# Patient Record
Sex: FEMALE | Race: WHITE | NOT HISPANIC OR LATINO | Employment: UNEMPLOYED | URBAN - METROPOLITAN AREA
[De-identification: names, ages, dates, MRNs, and addresses within clinical notes are randomized per-mention and may not be internally consistent; named-entity substitution may affect disease eponyms.]

---

## 2019-02-22 ENCOUNTER — TELEPHONE (OUTPATIENT)
Dept: URGENT CARE | Facility: CLINIC | Age: 34
End: 2019-02-22

## 2019-02-22 DIAGNOSIS — R05.9 COUGH: Primary | ICD-10-CM

## 2019-02-22 RX ORDER — ALBUTEROL SULFATE 1.25 MG/3ML
1 SOLUTION RESPIRATORY (INHALATION) EVERY 6 HOURS PRN
Qty: 30 VIAL | Refills: 0 | Status: SHIPPED | OUTPATIENT
Start: 2019-02-22 | End: 2020-05-19

## 2019-02-22 RX ORDER — DEXTROMETHORPHAN HYDROBROMIDE AND PROMETHAZINE HYDROCHLORIDE 15; 6.25 MG/5ML; MG/5ML
5 SYRUP ORAL 4 TIMES DAILY PRN
Qty: 240 ML | Refills: 0 | OUTPATIENT
Start: 2019-02-22 | End: 2019-02-26

## 2019-02-22 NOTE — TELEPHONE ENCOUNTER
Pt was told Tuesday if she needed a cough med she could call back and we would send it in to her pharmacy. MARIA DE JESUS BERRY consulted and will send in cough meds and pt also requested some albuterol for her nebulizer.

## 2019-02-26 PROBLEM — R05.9 COUGH: Status: ACTIVE | Noted: 2019-02-26

## 2019-02-26 PROBLEM — H66.92 OTITIS OF LEFT EAR: Status: ACTIVE | Noted: 2019-02-26

## 2019-06-10 ENCOUNTER — OFFICE VISIT (OUTPATIENT)
Dept: FAMILY MEDICINE CLINIC | Facility: CLINIC | Age: 34
End: 2019-06-10

## 2019-06-10 VITALS
BODY MASS INDEX: 28.72 KG/M2 | WEIGHT: 183 LBS | HEART RATE: 116 BPM | TEMPERATURE: 97.7 F | SYSTOLIC BLOOD PRESSURE: 110 MMHG | DIASTOLIC BLOOD PRESSURE: 68 MMHG | RESPIRATION RATE: 18 BRPM | HEIGHT: 67 IN | OXYGEN SATURATION: 98 %

## 2019-06-10 DIAGNOSIS — R63.5 WEIGHT GAIN: ICD-10-CM

## 2019-06-10 DIAGNOSIS — A60.00 HERPES SIMPLEX INFECTION OF GENITOURINARY SYSTEM: ICD-10-CM

## 2019-06-10 DIAGNOSIS — J30.1 NON-SEASONAL ALLERGIC RHINITIS DUE TO POLLEN: Primary | ICD-10-CM

## 2019-06-10 LAB
ALBUMIN SERPL-MCNC: 4.1 G/DL (ref 3.5–5.2)
ALBUMIN/GLOB SERPL: 1.5 G/DL
ALP SERPL-CCNC: 136 U/L (ref 39–117)
ALT SERPL W P-5'-P-CCNC: 29 U/L (ref 1–33)
ANION GAP SERPL CALCULATED.3IONS-SCNC: 13.5 MMOL/L
AST SERPL-CCNC: 26 U/L (ref 1–32)
BASOPHILS # BLD MANUAL: 0.19 10*3/MM3 (ref 0–0.2)
BASOPHILS NFR BLD AUTO: 3 % (ref 0–1.5)
BILIRUB SERPL-MCNC: 0.2 MG/DL (ref 0.2–1.2)
BUN BLD-MCNC: 9 MG/DL (ref 6–20)
BUN/CREAT SERPL: 11 (ref 7–25)
CALCIUM SPEC-SCNC: 8.5 MG/DL (ref 8.6–10.5)
CHLORIDE SERPL-SCNC: 104 MMOL/L (ref 98–107)
CHOLEST SERPL-MCNC: 152 MG/DL (ref 0–200)
CO2 SERPL-SCNC: 20.5 MMOL/L (ref 22–29)
CREAT BLD-MCNC: 0.82 MG/DL (ref 0.57–1)
DEPRECATED RDW RBC AUTO: 43.7 FL (ref 37–54)
EOSINOPHIL # BLD MANUAL: 0.25 10*3/MM3 (ref 0–0.4)
EOSINOPHIL NFR BLD MANUAL: 4 % (ref 0.3–6.2)
ERYTHROCYTE [DISTWIDTH] IN BLOOD BY AUTOMATED COUNT: 13.6 % (ref 12.3–15.4)
GFR SERPL CREATININE-BSD FRML MDRD: 79 ML/MIN/1.73
GLOBULIN UR ELPH-MCNC: 2.8 GM/DL
GLUCOSE BLD-MCNC: 90 MG/DL (ref 65–99)
HCT VFR BLD AUTO: 44.2 % (ref 34–46.6)
HDLC SERPL-MCNC: 38 MG/DL (ref 40–60)
HGB BLD-MCNC: 14.3 G/DL (ref 12–15.9)
LDLC SERPL CALC-MCNC: 87 MG/DL (ref 0–100)
LDLC/HDLC SERPL: 2.3 {RATIO}
LYMPHOCYTES # BLD MANUAL: 3.56 10*3/MM3 (ref 0.7–3.1)
LYMPHOCYTES NFR BLD MANUAL: 5.1 % (ref 5–12)
LYMPHOCYTES NFR BLD MANUAL: 57.6 % (ref 19.6–45.3)
MCH RBC QN AUTO: 28.1 PG (ref 26.6–33)
MCHC RBC AUTO-ENTMCNC: 32.4 G/DL (ref 31.5–35.7)
MCV RBC AUTO: 87 FL (ref 79–97)
MONOCYTES # BLD AUTO: 0.32 10*3/MM3 (ref 0.1–0.9)
NEUTROPHILS # BLD AUTO: 1.69 10*3/MM3 (ref 1.7–7)
NEUTROPHILS NFR BLD MANUAL: 27.3 % (ref 42.7–76)
PLAT MORPH BLD: NORMAL
PLATELET # BLD AUTO: 196 10*3/MM3 (ref 140–450)
PMV BLD AUTO: 9.9 FL (ref 6–12)
POTASSIUM BLD-SCNC: 3.5 MMOL/L (ref 3.5–5.2)
PROT SERPL-MCNC: 6.9 G/DL (ref 6–8.5)
RBC # BLD AUTO: 5.08 10*6/MM3 (ref 3.77–5.28)
RBC MORPH BLD: NORMAL
SODIUM BLD-SCNC: 138 MMOL/L (ref 136–145)
TRIGL SERPL-MCNC: 133 MG/DL (ref 0–150)
TSH SERPL DL<=0.05 MIU/L-ACNC: 2.03 MIU/ML (ref 0.27–4.2)
VARIANT LYMPHS NFR BLD MANUAL: 3 % (ref 0–5)
VLDLC SERPL-MCNC: 26.6 MG/DL (ref 5–40)
WBC MORPH BLD: NORMAL
WBC NRBC COR # BLD: 6.18 10*3/MM3 (ref 3.4–10.8)

## 2019-06-10 PROCEDURE — 99215 OFFICE O/P EST HI 40 MIN: CPT | Performed by: FAMILY MEDICINE

## 2019-06-10 PROCEDURE — 85007 BL SMEAR W/DIFF WBC COUNT: CPT | Performed by: FAMILY MEDICINE

## 2019-06-10 PROCEDURE — 80050 GENERAL HEALTH PANEL: CPT | Performed by: FAMILY MEDICINE

## 2019-06-10 PROCEDURE — 80061 LIPID PANEL: CPT | Performed by: FAMILY MEDICINE

## 2019-06-10 RX ORDER — AMITRIPTYLINE HYDROCHLORIDE 50 MG/1
TABLET, FILM COATED ORAL
COMMUNITY
Start: 2019-06-04 | End: 2020-01-20

## 2019-06-10 RX ORDER — TOPIRAMATE 100 MG/1
100 TABLET, FILM COATED ORAL 2 TIMES DAILY
COMMUNITY
End: 2019-06-18

## 2019-06-10 RX ORDER — ACYCLOVIR 800 MG/1
800 TABLET ORAL DAILY
Qty: 30 TABLET | Refills: 5 | Status: SHIPPED | OUTPATIENT
Start: 2019-06-10 | End: 2020-01-20

## 2019-06-10 RX ORDER — HYDROXYZINE HYDROCHLORIDE 25 MG/1
25 TABLET, FILM COATED ORAL 3 TIMES DAILY PRN
COMMUNITY
End: 2020-08-06 | Stop reason: SDUPTHER

## 2019-06-10 RX ORDER — LISDEXAMFETAMINE DIMESYLATE 60 MG/1
CAPSULE ORAL
COMMUNITY
Start: 2019-05-24

## 2019-06-10 RX ORDER — DESVENLAFAXINE SUCCINATE 50 MG/1
TABLET, EXTENDED RELEASE ORAL
COMMUNITY
Start: 2019-05-26 | End: 2020-01-20

## 2019-06-17 ENCOUNTER — TELEPHONE (OUTPATIENT)
Dept: FAMILY MEDICINE CLINIC | Facility: CLINIC | Age: 34
End: 2019-06-17

## 2019-06-17 NOTE — TELEPHONE ENCOUNTER
Left a message for pt to call back.   ----- Message from Andreia Benitez sent at 6/17/2019 10:59 AM EDT -----  Regarding: LAB RESULTS  Contact: [phone]  PLEASE CALL TO DISCUSS LAB RESULTS

## 2019-06-18 ENCOUNTER — OFFICE VISIT (OUTPATIENT)
Dept: FAMILY MEDICINE CLINIC | Facility: CLINIC | Age: 34
End: 2019-06-18

## 2019-06-18 VITALS
RESPIRATION RATE: 18 BRPM | TEMPERATURE: 97.2 F | HEART RATE: 112 BPM | DIASTOLIC BLOOD PRESSURE: 70 MMHG | HEIGHT: 67 IN | WEIGHT: 186 LBS | BODY MASS INDEX: 29.19 KG/M2 | OXYGEN SATURATION: 99 % | SYSTOLIC BLOOD PRESSURE: 100 MMHG

## 2019-06-18 DIAGNOSIS — R63.5 WEIGHT GAIN: Primary | ICD-10-CM

## 2019-06-18 LAB
25(OH)D3 SERPL-MCNC: 23.3 NG/ML (ref 30–100)
FSH SERPL-ACNC: 7.68 MIU/ML
HBA1C MFR BLD: 4.8 % (ref 4.8–5.6)
LH SERPL-ACNC: 25.1 MIU/ML
VIT B12 BLD-MCNC: 456 PG/ML (ref 211–946)

## 2019-06-18 PROCEDURE — 82607 VITAMIN B-12: CPT | Performed by: FAMILY MEDICINE

## 2019-06-18 PROCEDURE — 83001 ASSAY OF GONADOTROPIN (FSH): CPT | Performed by: FAMILY MEDICINE

## 2019-06-18 PROCEDURE — 82306 VITAMIN D 25 HYDROXY: CPT | Performed by: FAMILY MEDICINE

## 2019-06-18 PROCEDURE — 83036 HEMOGLOBIN GLYCOSYLATED A1C: CPT | Performed by: FAMILY MEDICINE

## 2019-06-18 PROCEDURE — 83002 ASSAY OF GONADOTROPIN (LH): CPT | Performed by: FAMILY MEDICINE

## 2019-06-18 PROCEDURE — 83525 ASSAY OF INSULIN: CPT | Performed by: FAMILY MEDICINE

## 2019-06-18 PROCEDURE — 99213 OFFICE O/P EST LOW 20 MIN: CPT | Performed by: FAMILY MEDICINE

## 2019-06-18 NOTE — PROGRESS NOTES
Subjective   Kimberly Villanueva is a 33 y.o. female.     History of Present Illness   Pt still concerned about weight. Has had recent labs and stable.  Has been following with psychiatry and has recently been taken off of Topamax. Continues on Elavil, Vyvanse, and Pristiq.  Pt has been eating healthy and exercising and not losing weight.    The following portions of the patient's history were reviewed and updated as appropriate: allergies, current medications, past family history, past medical history, past social history, past surgical history and problem list.  Vitals:    06/18/19 1142   BP: 100/70   Pulse: 112   Resp: 18   Temp: 97.2 °F (36.2 °C)   SpO2: 99%     Body mass index is 29.13 kg/m².  Review of Systems   Constitutional: Negative.  Negative for activity change, fatigue, fever, unexpected weight gain and unexpected weight loss.   HENT: Negative.  Negative for congestion, sneezing and sore throat.    Eyes: Negative.  Negative for blurred vision, double vision and visual disturbance.   Respiratory: Negative.  Negative for cough, chest tightness, shortness of breath and wheezing.    Cardiovascular: Negative.  Negative for chest pain, palpitations and leg swelling.   Gastrointestinal: Negative.  Negative for abdominal distention, abdominal pain, blood in stool, constipation, diarrhea and nausea.   Endocrine: Negative.  Negative for cold intolerance and heat intolerance.   Genitourinary: Negative.  Negative for urinary incontinence, dysuria, frequency and urgency.   Musculoskeletal: Negative.  Negative for arthralgias and myalgias.   Skin: Negative.  Negative for rash.   Allergic/Immunologic: Negative.    Neurological: Negative.  Negative for dizziness, syncope, numbness and memory problem.   Hematological: Negative.  Negative for adenopathy.   Psychiatric/Behavioral: Negative.  Negative for suicidal ideas and depressed mood. The patient is not nervous/anxious.    All other systems reviewed and are  negative.      Objective   Physical Exam   Constitutional: She is oriented to person, place, and time. She appears well-developed and well-nourished. No distress.   HENT:   Right Ear: External ear normal.   Left Ear: External ear normal.   Nose: Nose normal.   Mouth/Throat: Oropharynx is clear and moist.   Eyes: Conjunctivae and EOM are normal. Pupils are equal, round, and reactive to light.   Neck: Normal range of motion. Neck supple. No thyromegaly present.   Cardiovascular: Normal rate, regular rhythm and normal heart sounds.   No murmur heard.  Pulmonary/Chest: Effort normal and breath sounds normal. No respiratory distress. She has no wheezes.   Abdominal: Soft. Bowel sounds are normal. She exhibits no distension and no mass. There is no tenderness. There is no rebound and no guarding. No hernia.   Musculoskeletal: Normal range of motion. She exhibits no edema or tenderness.   Lymphadenopathy:     She has no cervical adenopathy.   Neurological: She is alert and oriented to person, place, and time. She has normal reflexes.   Skin: Skin is warm and dry. No rash noted. She is not diaphoretic. No erythema. No pallor.   Psychiatric: She has a normal mood and affect. Her behavior is normal. Judgment and thought content normal.   Nursing note and vitals reviewed.          Assessment/Plan   Kimberly was seen today for weight loss.    Diagnoses and all orders for this visit:    Weight gain  -     Hemoglobin A1c  -     Insulin, Total  -     Follicle Stimulating Hormone  -     Luteinizing Hormone  -     Vitamin B12  -     Vitamin D 25 Hydroxy    Discussed with patient counseling on nutrition. Discussed in detail recommendations regarding decreasing animal fat and dairy and increasing fruits and vegetables. Discussed cutting refined sugars and white breads. Recommend beans, legumes, whole grains, nuts and seeds. Recommend cutting all processed foods. Given handouts from physicians committee on responsible medicine.

## 2019-06-19 LAB — INSULIN SERPL-ACNC: 11.4 UIU/ML (ref 2.6–24.9)

## 2019-10-09 ENCOUNTER — OFFICE VISIT (OUTPATIENT)
Dept: OBSTETRICS AND GYNECOLOGY | Facility: CLINIC | Age: 34
End: 2019-10-09

## 2019-10-09 VITALS
SYSTOLIC BLOOD PRESSURE: 118 MMHG | DIASTOLIC BLOOD PRESSURE: 72 MMHG | BODY MASS INDEX: 29.29 KG/M2 | WEIGHT: 187 LBS | RESPIRATION RATE: 14 BRPM

## 2019-10-09 DIAGNOSIS — Z30.09 GENERAL COUNSELING AND ADVICE FOR CONTRACEPTIVE MANAGEMENT: Primary | ICD-10-CM

## 2019-10-09 PROCEDURE — 99203 OFFICE O/P NEW LOW 30 MIN: CPT | Performed by: OBSTETRICS & GYNECOLOGY

## 2019-10-09 RX ORDER — DEXTROAMPHETAMINE SACCHARATE, AMPHETAMINE ASPARTATE, DEXTROAMPHETAMINE SULFATE AND AMPHETAMINE SULFATE 5; 5; 5; 5 MG/1; MG/1; MG/1; MG/1
20 TABLET ORAL DAILY
COMMUNITY

## 2019-10-09 RX ORDER — HYDROXYZINE PAMOATE 50 MG/1
CAPSULE ORAL
COMMUNITY
Start: 2019-09-04 | End: 2019-11-04

## 2019-10-09 RX ORDER — TOPIRAMATE 100 MG/1
TABLET, FILM COATED ORAL
COMMUNITY
Start: 2019-09-18

## 2019-10-09 RX ORDER — NORETHINDRONE ACETATE AND ETHINYL ESTRADIOL 1; .02 MG/1; MG/1
1 TABLET ORAL DAILY
Qty: 21 TABLET | Refills: 12 | Status: SHIPPED | OUTPATIENT
Start: 2019-10-09 | End: 2020-01-20

## 2019-10-09 NOTE — PROGRESS NOTES
Subjective   Chief Complaint   Patient presents with   • Gynecologic Exam     Kimberly Villanueva is a 33 y.o. year old .  Patient's last menstrual period was 2019 (approximate).  She presents to be seen because of discussion of labs drawn by primary care physician and discuss contraception.    Menarche 7 years   Patient's last menstrual period was 2019 (approximate).  Reports regular predictable monthly menses  Duration 4-5 days  No intermenstrual bleeding  Currently sexually active. No new partners in the past year.   PCP ordered this blood work because of weight gain  Denies any issues with hirsutism   Reports dysmenorrhea but is manageable.   Reports she had an annual in [place] about 4-5 months ago  She would like about birth control.    OTHER THINGS SHE WANTS TO DISCUSS TODAY:  Nothing else    The following portions of the patient's history were reviewed and updated as appropriate:current medications, allergies, past family history, past medical history, past social history and past surgical history    Social History    Tobacco Use      Smoking status: Never Smoker      Smokeless tobacco: Never Used    Review of Systems  Constitutional POS: nothing reported    NEG: anorexia or night sweats   Genitourinary POS: nothing reported    NEG: dysuria or hematuria   Gastointestinal POS: nothing reported    NEG: bloating, change in bowel habits, melena or reflux symptoms   Integument POS: nothing reported    NEG: moles that are changing in size, shape, color or rashes   Breast POS: nothing reported    NEG: persistent breast lump, skin dimpling or nipple discharge         Objective   /72   Resp 14   Wt 84.8 kg (187 lb)   LMP 2019 (Approximate)   Breastfeeding? No   BMI 29.29 kg/m²     General:  well developed; well nourished  no acute distress                                 Lab Review   TSH, LF, FSH    Imaging   No data reviewed        Assessment   1. Contraceptive counseling  2. History  of genital HSV  3. Weight gain     Plan   1. Reviewed contraceptive options including long-acting reversible contraception.  Patient declines this at this time and desires to restart a birth control pill which she has used in the past and tolerated well.  Prescription sent to pharmacy. She was instructed how to start her birth control.  It can be started at any time.  Because it may take 1 month to become effective, the use of alternative contraception for one month was stressed.  The potential for breakthrough bleeding for up to 4 cycles was also emphasized.  2. Reviewed with patient based on the [place] criteria I do not think that she has polycystic ovarian syndrome as she has regular menses denies any hirsutism.  Reviewed the importance of diet and exercise in regards to weight loss.  3. She declines any sexually transmitted infection screening today.  4. The importance of keeping all planned follow-up and taking all medications as prescribed was emphasized.  5. The following data needs to be obtained to update her medical records: last PAP.  6. Follow up for recheck of periods and ocp follow up in 4 months.  We will need to schedule annual exam at that time.    New Medications Ordered This Visit   Medications   • norethindrone-ethinyl estradiol (LOESTRIN 1/20, 21,) 1-20 MG-MCG per tablet     Sig: Take 1 tablet by mouth Daily.     Dispense:  21 tablet     Refill:  12          This note was electronically signed.    Clara Villafuerte MD  October 9, 2019    Note: Speech recognition transcription software may have been used to create portions of this document.  An attempt at proofreading has been made but errors in transcription could still be present.

## 2019-11-05 ENCOUNTER — TELEPHONE (OUTPATIENT)
Dept: URGENT CARE | Facility: CLINIC | Age: 34
End: 2019-11-05

## 2019-11-05 DIAGNOSIS — J06.9 ACUTE URI: Primary | ICD-10-CM

## 2019-11-05 RX ORDER — AZITHROMYCIN 250 MG/1
TABLET, FILM COATED ORAL
Qty: 6 TABLET | Refills: 0 | Status: SHIPPED | OUTPATIENT
Start: 2019-11-05 | End: 2020-01-20

## 2020-01-01 NOTE — TELEPHONE ENCOUNTER
Spoke to pt regarding labs and inquiry on a medication per Dr. Moody she will not prescribe Topamax if another Dr. Took her off of it, but would discuss other weight loss solutions if she wanted. Pt verbalized understanding and appointment was made for 6/18/2019 at 11:15.     ----- Message from Andreia Benitez sent at 6/17/2019 10:59 AM EDT -----  Regarding: LAB RESULTS  Contact: [phone]  PLEASE CALL TO DISCUSS LAB RESULTS     Statement Selected

## 2020-01-20 ENCOUNTER — OFFICE VISIT (OUTPATIENT)
Dept: FAMILY MEDICINE CLINIC | Facility: CLINIC | Age: 35
End: 2020-01-20

## 2020-01-20 VITALS
WEIGHT: 191.2 LBS | BODY MASS INDEX: 25.9 KG/M2 | HEIGHT: 72 IN | TEMPERATURE: 96.8 F | RESPIRATION RATE: 16 BRPM | HEART RATE: 89 BPM | DIASTOLIC BLOOD PRESSURE: 70 MMHG | SYSTOLIC BLOOD PRESSURE: 118 MMHG | OXYGEN SATURATION: 97 %

## 2020-01-20 DIAGNOSIS — J18.9 PNEUMONIA OF RIGHT LUNG DUE TO INFECTIOUS ORGANISM, UNSPECIFIED PART OF LUNG: Primary | ICD-10-CM

## 2020-01-20 PROCEDURE — 99213 OFFICE O/P EST LOW 20 MIN: CPT | Performed by: FAMILY MEDICINE

## 2020-01-20 RX ORDER — AMOXICILLIN AND CLAVULANATE POTASSIUM 875; 125 MG/1; MG/1
1 TABLET, FILM COATED ORAL 2 TIMES DAILY
COMMUNITY
End: 2020-04-20

## 2020-01-20 NOTE — PROGRESS NOTES
Kimberly Villanueva is a 34 y.o. female who presents today for office visit (pt needs release to go back to work) and swollen feet/face      Ms. Villanueva is a 34 yr old female with a PMH of RUTHANN, ADHD, and depression who presents today for a f/u care after admission in [facility] for pneumonia. She states she went to [facility] on 1/15/2020 for pleuritic pain on her right side, dyspnea, and body aches and was admitted for pneumonia. She states she was Tx with Augmentin, Ibuprofen, and mucinex. She reports that she is still taking the Augmentin but does not know how many more days. She reports taking the medication as instructed. The Pt states that she is feeling better since being discharged from the hospital. She reports that she is still fatigued with mild right sided pain, but she denies dyspnea, chest pain, cough, fever, body aches, and light headedness. She states that her face and feet bilaterally have been mildly swollen since she was discharged from the hospital. She reports the swelling in the feet have improved a little bit but the face appears the same. She states that she did receive fluid in the hospital and ED. The Pt reports that she has only had swelling in her feet when she was pregnant and has never before had facial swelling. She denies pruritis or pain or orthopnea. The Pt reports that she has felt depressed since being in the hospital, but she has a psychiatrist who manages her ADHD, RUTHANN, and depression. She denies SI or thoughts of harming others.        Review of Systems   Constitutional: Positive for fatigue. Negative for chills, fever, unexpected weight gain and unexpected weight loss.   HENT: Negative for congestion, sinus pressure and sore throat.    Eyes: Negative for blurred vision and visual disturbance.   Respiratory: Negative for cough, chest tightness, shortness of breath and wheezing.    Cardiovascular: Positive for leg swelling. Negative for chest pain and palpitations.  "  Gastrointestinal: Positive for constipation. Negative for abdominal pain, blood in stool, diarrhea, nausea and vomiting.   Genitourinary: Negative for difficulty urinating.   Neurological: Negative for dizziness, syncope, weakness, light-headedness and headache.   Psychiatric/Behavioral: Positive for depressed mood. Negative for suicidal ideas.        The following portions of the patient's history were reviewed and updated as appropriate: allergies, current medications, past family history, past medical history, past social history, past surgical history and problem list.    Current Outpatient Medications on File Prior to Visit   Medication Sig Dispense Refill   • albuterol (ACCUNEB) 1.25 MG/3ML nebulizer solution Take 3 mL by nebulization Every 6 (Six) Hours As Needed for Wheezing. 30 vial 0   • albuterol sulfate HFA (VENTOLIN HFA) 108 (90 Base) MCG/ACT inhaler Inhale 2 puffs Every 6 (Six) Hours As Needed for Wheezing or Shortness of Air. 1 inhaler 0   • amoxicillin-clavulanate (AUGMENTIN) 875-125 MG per tablet Take 1 tablet by mouth 2 (Two) Times a Day.     • amphetamine-dextroamphetamine (ADDERALL) 20 MG tablet Take 20 mg by mouth Daily.     • hydrOXYzine (ATARAX) 25 MG tablet Take 25 mg by mouth 3 (Three) Times a Day As Needed for Itching.     • topiramate (TOPAMAX) 100 MG tablet      • VYVANSE 60 MG capsule        No current facility-administered medications on file prior to visit.        No Known Allergies     Visit Vitals  /70   Pulse 89   Temp 96.8 °F (36 °C)   Resp 16   Ht 236.2 cm (93\")   Wt 86.7 kg (191 lb 3.2 oz)   LMP 01/01/2020 (Approximate)   SpO2 97%   BMI 15.54 kg/m²        Physical Exam   Constitutional: She is oriented to person, place, and time. She appears well-developed and well-nourished.   HENT:   Head: Normocephalic and atraumatic.   Neck: Normal range of motion. Neck supple.   Cardiovascular: Normal rate, regular rhythm, normal heart sounds and intact distal pulses. Exam reveals no " gallop and no friction rub.   No murmur heard.  Pulmonary/Chest: Effort normal and breath sounds normal. No respiratory distress. She has no wheezes. She exhibits no tenderness.   Abdominal: Soft. Bowel sounds are normal. She exhibits no distension and no mass. There is no tenderness. There is no guarding.   Musculoskeletal: She exhibits edema (trace bilateral feet).   Trace edema of face along zygomatic arches bilaterally   Neurological: She is alert and oriented to person, place, and time.   Skin: Skin is warm and dry.   Psychiatric: She has a normal mood and affect. Her behavior is normal.   Vitals reviewed.            Problems Addressed this Visit        Respiratory    Pneumonia of right lung due to infectious organism - Primary     Patient feels much better since being discharged in the hospital.  She is currently asymptomatic.  She is unsure how many days of Augmentin she has remaining to complete the course prescribed her.  She will complete antibiotics.  States when she gets home she will call clinic with the number of pills remaining in order to get note to go back to work.  RTC precautions given.         Relevant Medications    amoxicillin-clavulanate (AUGMENTIN) 875-125 MG per tablet          Return if symptoms worsen or fail to improve.    Parts of this office note have been dictated by voice recognition software. Grammatical and/or spelling errors may be present.    Luis Armando Kulkarni MD   1/21/2020

## 2020-01-21 ENCOUNTER — TELEPHONE (OUTPATIENT)
Dept: FAMILY MEDICINE CLINIC | Facility: CLINIC | Age: 35
End: 2020-01-21

## 2020-01-21 NOTE — TELEPHONE ENCOUNTER
Pt called and said she needs a drs note from yesterday and that its ok to go back to work on Monday 01/27/20; please call when ready for     Kimberly: [phone]

## 2020-01-21 NOTE — ASSESSMENT & PLAN NOTE
Patient feels much better since being discharged in the hospital.  She is currently asymptomatic.  She is unsure how many days of Augmentin she has remaining to complete the course prescribed her.  She will complete antibiotics.  States when she gets home she will call clinic with the number of pills remaining in order to get note to go back to work.  RTC precautions given.

## 2020-01-22 NOTE — TELEPHONE ENCOUNTER
PT  SEEN LIZETTE HERNANDEZ WANTED TO FOLLOW UP ON GETTING THE WORK NOTE     PLEASE ADVISE AND GIVE CALL

## 2020-02-11 ENCOUNTER — TELEPHONE (OUTPATIENT)
Dept: OBSTETRICS AND GYNECOLOGY | Facility: CLINIC | Age: 35
End: 2020-02-11

## 2020-02-11 NOTE — TELEPHONE ENCOUNTER
Called patient regarding no show for appointment with Dr Villafuerte, left message for patient to call to reschedule appointment    Mone Posada

## 2020-02-28 DIAGNOSIS — A60.00 HERPES SIMPLEX INFECTION OF GENITOURINARY SYSTEM: ICD-10-CM

## 2020-02-28 RX ORDER — ACYCLOVIR 800 MG/1
TABLET ORAL
Qty: 30 TABLET | Refills: 4 | Status: SHIPPED | OUTPATIENT
Start: 2020-02-28

## 2020-04-20 ENCOUNTER — TELEMEDICINE (OUTPATIENT)
Dept: FAMILY MEDICINE CLINIC | Facility: TELEHEALTH | Age: 35
End: 2020-04-20

## 2020-04-20 ENCOUNTER — E-VISIT (OUTPATIENT)
Dept: FAMILY MEDICINE CLINIC | Facility: CLINIC | Age: 35
End: 2020-04-20

## 2020-04-20 DIAGNOSIS — J06.9 ACUTE URI: Primary | ICD-10-CM

## 2020-04-20 DIAGNOSIS — J01.00 ACUTE NON-RECURRENT MAXILLARY SINUSITIS: ICD-10-CM

## 2020-04-20 DIAGNOSIS — H92.03 OTALGIA OF BOTH EARS: ICD-10-CM

## 2020-04-20 PROCEDURE — 99214 OFFICE O/P EST MOD 30 MIN: CPT | Performed by: NURSE PRACTITIONER

## 2020-04-20 PROCEDURE — 99422 OL DIG E/M SVC 11-20 MIN: CPT | Performed by: FAMILY MEDICINE

## 2020-04-20 RX ORDER — AMOXICILLIN 500 MG/1
500 CAPSULE ORAL 3 TIMES DAILY
Qty: 30 CAPSULE | Refills: 0 | Status: SHIPPED | OUTPATIENT
Start: 2020-04-20 | End: 2020-05-19

## 2020-04-20 RX ORDER — CEFDINIR 300 MG/1
300 CAPSULE ORAL 2 TIMES DAILY
Qty: 20 CAPSULE | Refills: 0 | Status: SHIPPED | OUTPATIENT
Start: 2020-04-20 | End: 2020-04-30

## 2020-04-20 NOTE — PROGRESS NOTES
Subjective   Kimberly Villanueva is a 34 y.o. female.   E-visit  History of Present Illness   Patient has had cough congestion headache sinus pain scratchy throat and sneezing for more than a month.  She has been taking Flonase, Claritin, Benadryl little relief.  She denies having a fever or exposed to COVID-19.  The following portions of the patient's history were reviewed and updated as appropriate: allergies, current medications, past family history, past medical history, past social history, past surgical history and problem list.    Review of Systems  Negative except for cough congestion sore throat sinus pain  Objective   Physical Exam  Unable due to E-visit    Assessment/Plan   Diagnoses and all orders for this visit:    Acute URI    Other orders  -     amoxicillin (AMOXIL) 500 MG capsule; Take 1 capsule by mouth 3 (Three) Times a Day.                  Continue Flonase Benadryl and Claritin.  Report to urgent treatment or ER if symptoms worsen or persist to consider COVID-19 testing.  Today I have spent a total of 11 minutes  evisit with Kimberly Villanueva.  During that time, a total of 11 minutes was spent counseling on URI.  Discussed medications and side effects.  Discussed compliance and follow-up.  Discussed what to do if symptoms worsen.

## 2020-04-20 NOTE — PROGRESS NOTES
Earache      Subjective   Kimberly Villanueva is a 34 y.o. female.     Earache    There is pain in both ears. This is a new problem. Episode onset: 1 month. The problem occurs constantly. The problem has been unchanged. There has been no fever. The pain is moderate. Associated symptoms include coughing (secondary to PND). Pertinent negatives include no abdominal pain, diarrhea, ear discharge, headaches, hearing loss, neck pain, rhinorrhea, sore throat or vomiting. Treatments tried: Claritin and Flonase; benadryl at night. The treatment provided no relief. There is no history of a chronic ear infection, hearing loss or a tympanostomy tube.        Patient Active Problem List   Diagnosis   • Otitis of left ear   • Cough   • Pneumonia of right lung due to infectious organism       No Known Allergies     Current Outpatient Medications on File Prior to Visit   Medication Sig Dispense Refill   • acyclovir (ZOVIRAX) 800 MG tablet TAKE ONE TABLET BY MOUTH DAILY 30 tablet 4   • albuterol (ACCUNEB) 1.25 MG/3ML nebulizer solution Take 3 mL by nebulization Every 6 (Six) Hours As Needed for Wheezing. 30 vial 0   • albuterol sulfate HFA (VENTOLIN HFA) 108 (90 Base) MCG/ACT inhaler Inhale 2 puffs Every 6 (Six) Hours As Needed for Wheezing or Shortness of Air. 1 inhaler 0   • amphetamine-dextroamphetamine (ADDERALL) 20 MG tablet Take 20 mg by mouth Daily.     • hydrOXYzine (ATARAX) 25 MG tablet Take 25 mg by mouth 3 (Three) Times a Day As Needed for Itching.     • topiramate (TOPAMAX) 100 MG tablet      • VYVANSE 60 MG capsule      • [DISCONTINUED] amoxicillin-clavulanate (AUGMENTIN) 875-125 MG per tablet Take 1 tablet by mouth 2 (Two) Times a Day.       No current facility-administered medications on file prior to visit.        Past Medical History:   Diagnosis Date   • ADHD    • Anxiety    • Depression        Past Surgical History:   Procedure Laterality Date   • CHOLECYSTECTOMY         Family History   Problem Relation Age of Onset    • No Known Problems Mother    • Heart disease Father    • No Known Problems Sister    • No Known Problems Daughter    • Breast cancer Neg Hx    • Colon cancer Neg Hx    • Ovarian cancer Neg Hx        Social History     Socioeconomic History   • Marital status: Single     Spouse name: Not on file   • Number of children: Not on file   • Years of education: Not on file   • Highest education level: Not on file   Tobacco Use   • Smoking status: Never Smoker   • Smokeless tobacco: Never Used   Substance and Sexual Activity   • Alcohol use: No     Frequency: Never   • Drug use: No   • Sexual activity: Yes     Partners: Male       Travel:  No recent travel within the last 21 days outside the [place] Denies recent travel to one of the following [place]:  [place], [place], [place], or [place].  Denies contact with anyone who has traveled to one of the following [place]  Countries: [place], [place], [place], or [place] within the last 21 days and is known or suspected to have Ebola.  Denies having had any contact with the human remains, blood or any bodily fluids of someone who is known or suspected to have Ebola within the last 21 days.     OB History        3    Para   1    Term   1            AB   2    Living   1       SAB        TAB   2    Ectopic        Molar        Multiple        Live Births              Obstetric Comments   Denies any abnormal paps   History of genital HSV             Review of Systems   Constitutional: Negative for activity change, appetite change, chills, fatigue and fever.   HENT: Positive for congestion (nasal), ear pain, postnasal drip, sinus pressure and sneezing. Negative for ear discharge, hearing loss, rhinorrhea, sinus pain, sore throat, tinnitus, trouble swallowing and voice change.    Respiratory: Positive for cough (secondary to PND). Negative for chest tightness, shortness of breath and wheezing.    Gastrointestinal: Negative for abdominal pain, diarrhea  and vomiting.   Musculoskeletal: Negative for neck pain.   Neurological: Negative for headaches.   All other systems reviewed and are negative.      LMP 03/23/2020 (Approximate)   Breastfeeding No     Objective   Physical Exam   Constitutional: She is oriented to person, place, and time. She appears well-developed and well-nourished. No distress.   HENT:   Head: Normocephalic and atraumatic.   Pulmonary/Chest: Effort normal.   No cough noted during video visit.   Neurological: She is alert and oriented to person, place, and time.   Psychiatric: She has a normal mood and affect. Her behavior is normal.   *limited physical exam secondary to video visit    Assessment/Plan   Kimberly was seen today for earache.    Diagnoses and all orders for this visit:    Acute non-recurrent maxillary sinusitis  -     cefdinir (OMNICEF) 300 MG capsule; Take 1 capsule by mouth 2 (Two) Times a Day for 10 days.    Otalgia of both ears  -     cefdinir (OMNICEF) 300 MG capsule; Take 1 capsule by mouth 2 (Two) Times a Day for 10 days.    Instructed to rest, increase fluids, alternate Tylenol and Motrin for pain; continue to use Claritin and Flonase, recommend switching to Claritin-D for post-nasal drip; take antibiotics to completion; follow up with PCP or go to [facility] for no improvement in 3-5 days or sooner for new or worsening symptoms. Verbalized understanding.      Video visit time spent on this patient approx 15 minutes.    Return if symptoms worsen or fail to improve.

## 2020-04-20 NOTE — PATIENT INSTRUCTIONS
Instructed to rest, increase fluids, alternate Tylenol and Motrin for pain; continue to use Claritin and Flonase, recommend switching to Claritin-D for post-nasal drip; take antibiotics to completion; follow up with PCP or go to [facility] for no improvement in 3-5 days or sooner for new or worsening symptoms.  Earache, Adult  An earache, or ear pain, can be caused by many things, including:  · An infection.  · Ear wax buildup.  · Ear pressure.  · Something in the ear that should not be there (foreign body).  · A sore throat.  · Tooth problems.  · Jaw problems.  Treatment of the earache will depend on the cause. If the cause is not clear or cannot be determined, you may need to watch your symptoms until your earache goes away or until a cause is found.  Follow these instructions at home:  Pay attention to any changes in your symptoms. Take these actions to help with your pain:  · Take or apply over-the-counter and prescription medicines only as told by your health care provider.  · If you were prescribed an antibiotic medicine, use it as told by your health care provider. Do not stop using the antibiotic even if you start to feel better.  · Do not put anything in your ear other than medicine that is prescribed by your health care provider.  · If directed, apply heat to the affected area as often as told by your health care provider. Use the heat source that your health care provider recommends, such as a moist heat pack or a heating pad.  ? Place a towel between your skin and the heat source.  ? Leave the heat on for 20-30 minutes.  ? Remove the heat if your skin turns bright red. This is especially important if you are unable to feel pain, heat, or cold. You may have a greater risk of getting burned.  · If directed, put ice on the ear:  ? Put ice in a plastic bag.  ? Place a towel between your skin and the bag.  ? Leave the ice on for 20 minutes, 2-3 times a day.  · Try resting in an upright position instead of lying  down. This may help to reduce pressure in your ear and relieve pain.  · Chew gum if it helps to relieve your ear pain.  · Treat any allergies as told by your health care provider.  · Keep all follow-up visits as told by your health care provider. This is important.  Contact a health care provider if:  · Your pain does not improve within 2 days.  · Your earache gets worse.  · You have new symptoms.  · You have a fever.  Get help right away if:  · You have a severe headache.  · You have a stiff neck.  · You have trouble swallowing.  · You have redness or swelling behind your ear.  · You have fluid or blood coming from your ear.  · You have hearing loss.  · You feel dizzy.  This information is not intended to replace advice given to you by your health care provider. Make sure you discuss any questions you have with your health care provider.  Document Released: 08/04/2005 Document Revised: 08/15/2017 Document Reviewed: 06/12/2017  [employer] Interactive Patient Education © 2020 [employer]  Sinusitis, Adult  Sinusitis is soreness and swelling (inflammation) of your sinuses. Sinuses are hollow spaces in the bones around your face. They are located:  · Around your eyes.  · In the middle of your forehead.  · Behind your nose.  · In your cheekbones.  Your sinuses and nasal passages are lined with a fluid called mucus. Mucus drains out of your sinuses. Swelling can trap mucus in your sinuses. This lets germs (bacteria, virus, or fungus) grow, which leads to infection. Most of the time, this condition is caused by a virus.  What are the causes?  This condition is caused by:  · Allergies.  · Asthma.  · Germs.  · Things that block your nose or sinuses.  · Growths in the nose (nasal polyps).  · Chemicals or irritants in the air.  · Fungus (rare).  What increases the risk?  You are more likely to develop this condition if:  · You have a weak body defense system (immune system).  · You do a lot of swimming or diving.  · You use  nasal sprays too much.  · You smoke.  What are the signs or symptoms?  The main symptoms of this condition are pain and a feeling of pressure around the sinuses. Other symptoms include:  · Stuffy nose (congestion).  · Runny nose (drainage).  · Swelling and warmth in the sinuses.  · Headache.  · Toothache.  · A cough that may get worse at night.  · Mucus that collects in the throat or the back of the nose (postnasal drip).  · Being unable to smell and taste.  · Being very tired (fatigue).  · A fever.  · Sore throat.  · Bad breath.  How is this diagnosed?  This condition is diagnosed based on:  · Your symptoms.  · Your medical history.  · A physical exam.  · Tests to find out if your condition is short-term (acute) or long-term (chronic). Your doctor may:  ? Check your nose for growths (polyps).  ? Check your sinuses using a tool that has a light (endoscope).  ? Check for allergies or germs.  ? Do imaging tests, such as an MRI or CT scan.  How is this treated?  Treatment for this condition depends on the cause and whether it is short-term or long-term.  · If caused by a virus, your symptoms should go away on their own within 10 days. You may be given medicines to relieve symptoms. They include:  ? Medicines that shrink swollen tissue in the nose.  ? Medicines that treat allergies (antihistamines).  ? A spray that treats swelling of the nostrils.   ? Rinses that help get rid of thick mucus in your nose (nasal saline washes).  · If caused by bacteria, your doctor may wait to see if you will get better without treatment. You may be given antibiotic medicine if you have:  ? A very bad infection.  ? A weak body defense system.  · If caused by growths in the nose, you may need to have surgery.  Follow these instructions at home:  Medicines  · Take, use, or apply over-the-counter and prescription medicines only as told by your doctor. These may include nasal sprays.  · If you were prescribed an antibiotic medicine, take it  as told by your doctor. Do not stop taking the antibiotic even if you start to feel better.  Hydrate and humidify    · Drink enough water to keep your pee (urine) pale yellow.  · Use a cool mist humidifier to keep the humidity level in your home above 50%.  · Breathe in steam for 10-15 minutes, 3-4 times a day, or as told by your doctor. You can do this in the bathroom while a hot shower is running.  · Try not to spend time in cool or dry air.  Rest  · Rest as much as you can.  · Sleep with your head raised (elevated).  · Make sure you get enough sleep each night.  General instructions    · Put a warm, moist washcloth on your face 3-4 times a day, or as often as told by your doctor. This will help with discomfort.  · Wash your hands often with soap and water. If there is no soap and water, use hand .  · Do not smoke. Avoid being around people who are smoking (secondhand smoke).  · Keep all follow-up visits as told by your doctor. This is important.  Contact a doctor if:  · You have a fever.  · Your symptoms get worse.  · Your symptoms do not get better within 10 days.  Get help right away if:  · You have a very bad headache.  · You cannot stop throwing up (vomiting).  · You have very bad pain or swelling around your face or eyes.  · You have trouble seeing.  · You feel confused.  · Your neck is stiff.  · You have trouble breathing.  Summary  · Sinusitis is swelling of your sinuses. Sinuses are hollow spaces in the bones around your face.  · This condition is caused by tissues in your nose that become inflamed or swollen. This traps germs. These can lead to infection.  · If you were prescribed an antibiotic medicine, take it as told by your doctor. Do not stop taking it even if you start to feel better.  · Keep all follow-up visits as told by your doctor. This is important.  This information is not intended to replace advice given to you by your health care provider. Make sure you discuss any questions you  have with your health care provider.  Document Released: 06/05/2009 Document Revised: 05/20/2019 Document Reviewed: 05/20/2019  [employer] Interactive Patient Education © 2020 [employer]

## 2020-05-13 ENCOUNTER — TRANSCRIBE ORDERS (OUTPATIENT)
Dept: LAB | Facility: HOSPITAL | Age: 35
End: 2020-05-13

## 2020-05-13 ENCOUNTER — LAB (OUTPATIENT)
Dept: LAB | Facility: HOSPITAL | Age: 35
End: 2020-05-13

## 2020-05-13 DIAGNOSIS — R63.5 ABNORMAL WEIGHT GAIN: ICD-10-CM

## 2020-05-13 DIAGNOSIS — R63.5 ABNORMAL WEIGHT GAIN: Primary | ICD-10-CM

## 2020-05-13 LAB
CHOLEST SERPL-MCNC: 148 MG/DL (ref 0–200)
DEPRECATED RDW RBC AUTO: 42.2 FL (ref 37–54)
ERYTHROCYTE [DISTWIDTH] IN BLOOD BY AUTOMATED COUNT: 13.2 % (ref 12.3–15.4)
GLUCOSE BLD-MCNC: 110 MG/DL (ref 65–99)
HCT VFR BLD AUTO: 42.7 % (ref 34–46.6)
HDLC SERPL-MCNC: 42 MG/DL (ref 40–60)
HGB BLD-MCNC: 14.5 G/DL (ref 12–15.9)
LDLC SERPL CALC-MCNC: 90 MG/DL (ref 0–100)
LDLC/HDLC SERPL: 2.14 {RATIO}
MCH RBC QN AUTO: 29.6 PG (ref 26.6–33)
MCHC RBC AUTO-ENTMCNC: 34 G/DL (ref 31.5–35.7)
MCV RBC AUTO: 87.1 FL (ref 79–97)
PLATELET # BLD AUTO: 321 10*3/MM3 (ref 140–450)
PMV BLD AUTO: 10.3 FL (ref 6–12)
RBC # BLD AUTO: 4.9 10*6/MM3 (ref 3.77–5.28)
TRIGL SERPL-MCNC: 80 MG/DL (ref 0–150)
TSH SERPL DL<=0.05 MIU/L-ACNC: 4.43 UIU/ML (ref 0.27–4.2)
VLDLC SERPL-MCNC: 16 MG/DL (ref 5–40)
WBC NRBC COR # BLD: 9.28 10*3/MM3 (ref 3.4–10.8)

## 2020-05-13 PROCEDURE — 85027 COMPLETE CBC AUTOMATED: CPT

## 2020-05-13 PROCEDURE — 36415 COLL VENOUS BLD VENIPUNCTURE: CPT

## 2020-05-13 PROCEDURE — 82947 ASSAY GLUCOSE BLOOD QUANT: CPT

## 2020-05-13 PROCEDURE — 84443 ASSAY THYROID STIM HORMONE: CPT

## 2020-05-13 PROCEDURE — 80061 LIPID PANEL: CPT

## 2020-05-14 ENCOUNTER — TELEPHONE (OUTPATIENT)
Dept: FAMILY MEDICINE CLINIC | Facility: CLINIC | Age: 35
End: 2020-05-14

## 2020-05-14 NOTE — TELEPHONE ENCOUNTER
PATIENT IS REQUESTING TO SWITCH PROVIDERS WITHIN THE PRACTICE, SHE WOULD LIKE TO HAVE DR. HERNANDEZ AS HER ASSIGNED PCP.     PATIENT REQUESTING TO SCHEDULE A VIRTUAL APPT WITH DR. HERNANDEZ TO DISCUSS RECENT LAB RESULTS ON HER THYROID.     PATIENT'S CALL BACK NUMBER [phone].

## 2020-05-19 ENCOUNTER — OFFICE VISIT (OUTPATIENT)
Dept: FAMILY MEDICINE CLINIC | Facility: CLINIC | Age: 35
End: 2020-05-19

## 2020-05-19 VITALS
BODY MASS INDEX: 30.13 KG/M2 | HEIGHT: 67 IN | DIASTOLIC BLOOD PRESSURE: 83 MMHG | WEIGHT: 192 LBS | HEART RATE: 97 BPM | SYSTOLIC BLOOD PRESSURE: 129 MMHG

## 2020-05-19 DIAGNOSIS — R63.5 WEIGHT GAIN: ICD-10-CM

## 2020-05-19 DIAGNOSIS — R79.89 ELEVATED TSH: Primary | ICD-10-CM

## 2020-05-19 PROBLEM — F41.9 ANXIETY: Status: ACTIVE | Noted: 2020-05-19

## 2020-05-19 PROBLEM — J18.9 PNEUMONIA OF RIGHT LUNG DUE TO INFECTIOUS ORGANISM: Status: RESOLVED | Noted: 2020-01-20 | Resolved: 2020-05-19

## 2020-05-19 PROBLEM — F32.A DEPRESSION: Status: ACTIVE | Noted: 2020-05-19

## 2020-05-19 PROBLEM — A60.09 HERPES GENITALIS IN WOMEN: Status: ACTIVE | Noted: 2020-05-19

## 2020-05-19 PROBLEM — J45.20 MILD INTERMITTENT ASTHMA WITHOUT COMPLICATION: Status: ACTIVE | Noted: 2020-05-19

## 2020-05-19 PROBLEM — F90.9 ADHD: Status: ACTIVE | Noted: 2020-05-19

## 2020-05-19 PROBLEM — R05.9 COUGH: Status: RESOLVED | Noted: 2019-02-26 | Resolved: 2020-05-19

## 2020-05-19 PROBLEM — H66.92 OTITIS OF LEFT EAR: Status: RESOLVED | Noted: 2019-02-26 | Resolved: 2020-05-19

## 2020-05-19 PROCEDURE — 99213 OFFICE O/P EST LOW 20 MIN: CPT | Performed by: FAMILY MEDICINE

## 2020-05-19 RX ORDER — CHLORAL HYDRATE 500 MG
CAPSULE ORAL
COMMUNITY

## 2020-05-19 RX ORDER — MELATONIN
1000 DAILY
COMMUNITY

## 2020-05-19 NOTE — ASSESSMENT & PLAN NOTE
We will recheck TSH and check a free T4 as previous TSH was elevated for further assessment of thyroid.  Patient was advised to hold biotin supplementation if it was present her multivitamin for 2 to 3 days before having lab work drawn.  Once testing is returned will develop plan further for weight loss.  Approximately 15 minutes was spent discussing symptoms, labs and developing plan of care with patient on the telephone today.

## 2020-05-19 NOTE — PROGRESS NOTES
Kimberly Villanueva is a 34 y.o. female who presents today to establish care.    Chief Complaint   Patient presents with   • Weight Gain   • Abnormal Lab        Patient consented to receiving telemedicine visit for telephone call today. Patient is completing telephone visit for concerning labs [facility] as well as to establish care. She was previously being seen by Dr. Moody but would like to switch providers. She was told her TSH and blood sugar were elevated and she could not get further medications until she was seen by PCP.  She is also unable to take weight loss medications due to being on Adderall and Vyvanse.  She sees psychiatrist for ADHD as well as anxiety and depression.  Patient has not been consistent with diet modification or exercise.  She states that she has had no changes in diet or activity level and has continuously gained weight for the past year.  She denies changes in hair, change in skin, heat or cold intolerance, appetite changes, nausea, vomiting, diarrhea, constipation, urinary symptoms, chest pain, palpitations, and edema.       Review of Systems   Constitutional: Positive for unexpected weight gain. Negative for activity change, appetite change, fever and unexpected weight loss.   HENT: Negative for congestion, ear pain and sore throat.    Eyes: Negative for visual disturbance.   Respiratory: Negative for cough, shortness of breath and wheezing.    Cardiovascular: Negative for chest pain and palpitations.   Gastrointestinal: Negative for abdominal pain, blood in stool, constipation, diarrhea, nausea, vomiting and GERD.   Endocrine: Negative for polydipsia and polyuria.   Genitourinary: Negative for difficulty urinating.   Musculoskeletal: Negative for joint swelling.   Skin: Negative for rash and skin lesions.   Allergic/Immunologic: Negative for environmental allergies.   Neurological: Negative for seizures and syncope.   Hematological:  Does not bruise/bleed easily.   Psychiatric/Behavioral: Negative for suicidal ideas.          Past Medical History:   Diagnosis Date   • ADHD    • Anxiety    • Depression         Past Surgical History:   Procedure Laterality Date   • CHOLECYSTECTOMY          Family History   Problem Relation Age of Onset   • No Known Problems Mother    • Heart disease Father    • No Known Problems Sister    • No Known Problems Daughter    • Breast cancer Neg Hx    • Colon cancer Neg Hx    • Ovarian cancer Neg Hx         Social History     Socioeconomic History   • Marital status: Single     Spouse name: Not on file   • Number of children: Not on file   • Years of education: Not on file   • Highest education level: Not on file   Tobacco Use   • Smoking status: Never Smoker   • Smokeless tobacco: Never Used   Substance and Sexual Activity   • Alcohol use: No     Frequency: Never   • Drug use: No   • Sexual activity: Yes     Partners: Male         No LMP recorded.    Current Outpatient Medications on File Prior to Visit   Medication Sig Dispense Refill   • acyclovir (ZOVIRAX) 800 MG tablet TAKE ONE TABLET BY MOUTH DAILY 30 tablet 4   • albuterol sulfate HFA (VENTOLIN HFA) 108 (90 Base) MCG/ACT inhaler Inhale 2 puffs Every 6 (Six) Hours As Needed for Wheezing or Shortness of Air. 1 inhaler 0   • amphetamine-dextroamphetamine (ADDERALL) 20 MG tablet Take 20 mg by mouth Daily.     • cholecalciferol (VITAMIN D3) 25 MCG (1000 UT) tablet Take 1,000 Units by mouth Daily.     • hydrOXYzine (ATARAX) 25 MG tablet Take 25 mg by mouth 3 (Three) Times a Day As Needed for Itching.     • Multiple Vitamin (MULTI VITAMIN DAILY PO) Take  by mouth.     • Omega-3 Fatty Acids (FISH OIL) 1000 MG capsule capsule Take  by mouth Daily With Breakfast.     • topiramate (TOPAMAX) 100 MG tablet      • VYVANSE 60 MG capsule      • [DISCONTINUED] albuterol (ACCUNEB) 1.25 MG/3ML nebulizer solution Take 3 mL by nebulization Every 6 (Six) Hours As Needed for  "Wheezing. 30 vial 0   • [DISCONTINUED] amoxicillin (AMOXIL) 500 MG capsule Take 1 capsule by mouth 3 (Three) Times a Day. 30 capsule 0     No current facility-administered medications on file prior to visit.        No Known Allergies     Visit Vitals  /83 (BP Location: Left arm, Patient Position: Sitting)   Pulse 97   Ht 170.2 cm (67\")   Wt 87.1 kg (192 lb)   BMI 30.07 kg/m²        Physical Exam   Constitutional: She is oriented to person, place, and time. No distress.   Pulmonary/Chest: No respiratory distress.   Neurological: She is alert and oriented to person, place, and time.   Psychiatric: She has a normal mood and affect. Her behavior is normal.        Results for orders placed or performed in visit on 05/13/20   Lipid Panel   Result Value Ref Range    Total Cholesterol 148 0 - 200 mg/dL    Triglycerides 80 0 - 150 mg/dL    HDL Cholesterol 42 40 - 60 mg/dL    LDL Cholesterol  90 0 - 100 mg/dL    VLDL Cholesterol 16 5 - 40 mg/dL    LDL/HDL Ratio 2.14    CBC (No Diff)   Result Value Ref Range    WBC 9.28 3.40 - 10.80 10*3/mm3    RBC 4.90 3.77 - 5.28 10*6/mm3    Hemoglobin 14.5 12.0 - 15.9 g/dL    Hematocrit 42.7 34.0 - 46.6 %    MCV 87.1 79.0 - 97.0 fL    MCH 29.6 26.6 - 33.0 pg    MCHC 34.0 31.5 - 35.7 g/dL    RDW 13.2 12.3 - 15.4 %    RDW-SD 42.2 37.0 - 54.0 fl    MPV 10.3 6.0 - 12.0 fL    Platelets 321 140 - 450 10*3/mm3   Glucose, Random   Result Value Ref Range    Glucose 110 (H) 65 - 99 mg/dL   TSH   Result Value Ref Range    TSH 4.430 (H) 0.270 - 4.200 uIU/mL        Problems Addressed this Visit        Other    Elevated TSH - Primary     We will recheck TSH and check a free T4 as previous TSH was elevated for further assessment of thyroid.  Patient was advised to hold biotin supplementation if it was present her multivitamin for 2 to 3 days before having lab work drawn.  Once testing is returned will develop plan further for weight loss.  Approximately 15 minutes was spent discussing symptoms, labs " and developing plan of care with patient on the telephone today.         Relevant Orders    T4, free    TSH Rfx On Abnormal To Free T4    Weight gain    Relevant Orders    T4, free    TSH Rfx On Abnormal To Free T4          Return if symptoms worsen or fail to improve.    Parts of this office note have been dictated by voice recognition software. Grammatical and/or spelling errors may be present.    Luis Armando Kulkarni MD   5/19/2020

## 2020-05-26 ENCOUNTER — LAB (OUTPATIENT)
Dept: FAMILY MEDICINE CLINIC | Facility: CLINIC | Age: 35
End: 2020-05-26

## 2020-05-26 DIAGNOSIS — R79.89 ELEVATED TSH: ICD-10-CM

## 2020-05-26 DIAGNOSIS — R63.5 WEIGHT GAIN: ICD-10-CM

## 2020-05-26 LAB — T4 FREE SERPL-MCNC: 1.18 NG/DL (ref 0.93–1.7)

## 2020-05-26 PROCEDURE — 84443 ASSAY THYROID STIM HORMONE: CPT | Performed by: FAMILY MEDICINE

## 2020-05-26 PROCEDURE — 84439 ASSAY OF FREE THYROXINE: CPT | Performed by: FAMILY MEDICINE

## 2020-05-27 LAB — TSH SERPL DL<=0.05 MIU/L-ACNC: 3.74 UIU/ML (ref 0.27–4.2)

## 2020-06-10 ENCOUNTER — TRANSCRIBE ORDERS (OUTPATIENT)
Dept: NUTRITION | Facility: HOSPITAL | Age: 35
End: 2020-06-10

## 2020-06-10 DIAGNOSIS — R63.5 ABNORMAL WEIGHT GAIN: Primary | ICD-10-CM

## 2020-06-25 ENCOUNTER — E-VISIT (OUTPATIENT)
Dept: FAMILY MEDICINE CLINIC | Facility: TELEHEALTH | Age: 35
End: 2020-06-25

## 2020-06-25 ENCOUNTER — TELEMEDICINE (OUTPATIENT)
Dept: FAMILY MEDICINE CLINIC | Facility: TELEHEALTH | Age: 35
End: 2020-06-25

## 2020-06-25 DIAGNOSIS — J02.9 PHARYNGITIS, UNSPECIFIED ETIOLOGY: Primary | ICD-10-CM

## 2020-06-25 DIAGNOSIS — J30.2 SEASONAL ALLERGIC RHINITIS, UNSPECIFIED TRIGGER: ICD-10-CM

## 2020-06-25 PROCEDURE — 99213 OFFICE O/P EST LOW 20 MIN: CPT

## 2020-06-25 RX ORDER — AMOXICILLIN AND CLAVULANATE POTASSIUM 875; 125 MG/1; MG/1
1 TABLET, FILM COATED ORAL EVERY 12 HOURS
Qty: 20 TABLET | Refills: 0 | Status: SHIPPED | OUTPATIENT
Start: 2020-06-25 | End: 2020-07-05

## 2020-06-25 RX ORDER — FLUTICASONE PROPIONATE 50 MCG
2 SPRAY, SUSPENSION (ML) NASAL DAILY
Qty: 1 BOTTLE | Refills: 0 | Status: SHIPPED | OUTPATIENT
Start: 2020-06-25 | End: 2020-07-25

## 2020-06-25 NOTE — PROGRESS NOTES
CHIEF COMPLAINT  Chief Complaint   Patient presents with   • Sore Throat   • Fever   • Facial Pain     HPI  Kimberly Villanueva is a 34 y.o. female  presents with complaint of sore throat, painful swallowing, fever as high as 101 F, and faitgue x 3-4 days. She also has some sinus congestion and sinus pressure around her nose but states that she gets seasonal allergies bad and has been out of her Claritin D; requests refill.  Pt has been doing salt water garlges and taking tylenol as needed without relief. She denies cough, SOA, loss of sense of taste or smell, V/D, rash.  She has some mild nausea and states that she has to spit a lot because swallowing is so painful. There are white spots on her tonsils.     Review of Systems   Constitutional: Positive for appetite change, chills, fatigue and fever. Negative for activity change.   HENT: Positive for congestion, ear pain, postnasal drip, rhinorrhea, sinus pressure, sore throat and trouble swallowing. Negative for dental problem, sinus pain, sneezing, tinnitus and voice change.    Eyes: Negative.    Respiratory: Negative for cough, chest tightness, shortness of breath, wheezing and stridor.    Cardiovascular: Negative.    Gastrointestinal: Positive for nausea. Negative for abdominal pain, diarrhea and vomiting.   Genitourinary: Negative for decreased urine volume.   Musculoskeletal: Negative for myalgias, neck pain and neck stiffness.   Skin: Negative for rash.   Neurological: Negative.    Hematological: Positive for adenopathy.       Past Medical History:   Diagnosis Date   • ADHD    • Anxiety    • Depression        Family History   Problem Relation Age of Onset   • No Known Problems Mother    • Heart disease Father    • No Known Problems Sister    • No Known Problems Daughter    • Breast cancer Neg Hx    • Colon cancer Neg Hx    • Ovarian cancer Neg Hx        Social History     Socioeconomic History   • Marital status: Single     Spouse name: Not on file   •  Number of children: Not on file   • Years of education: Not on file   • Highest education level: Not on file   Tobacco Use   • Smoking status: Never Smoker   • Smokeless tobacco: Never Used   Substance and Sexual Activity   • Alcohol use: No     Frequency: Never   • Drug use: No   • Sexual activity: Yes     Partners: Male         There were no vitals taken for this visit.    PHYSICAL EXAM  Physical Exam   Constitutional: She appears well-developed and well-nourished. No distress.   HENT:   Head: Normocephalic and atraumatic.   Right Ear: External ear normal.   Left Ear: External ear normal.   Mouth/Throat: Oropharynx is clear and moist.   pharyngeal erythema, tonsils enlarged bilaterally with exudates noted   Eyes: Conjunctivae are normal. No scleral icterus.   Pupils equal and round   Pulmonary/Chest: Effort normal. No stridor.  No respiratory distress. She no audible wheeze...  No coughing during encounter   Lymphadenopathy:     She has cervical adenopathy (mild bilaterally (pt self palpated)).   Neurological: She is alert. She is not disoriented.   Skin: Skin is warm and dry. No rash noted.   Psychiatric: She has a normal mood and affect.          Kimberly was seen today for sore throat and fever.    Diagnoses and all orders for this visit:    Pharyngitis, unspecified etiology  -     amoxicillin-clavulanate (AUGMENTIN) 875-125 MG per tablet; Take 1 tablet by mouth Every 12 (Twelve) Hours for 10 days.  - Warm salt water gargles as needed  - Tylenol or ibuprofen as needed for fever every 4-6 hours    Seasonal allergic rhinitis, unspecified trigger  -     loratadine-pseudoephedrine (Claritin-D 12 Hour) 5-120 MG per 12 hr tablet; Take 1 tablet by mouth Every 12 (Twelve) Hours As Needed for Allergies (and congestion) for up to 14 days.  -     fluticasone (FLONASE) 50 MCG/ACT nasal spray; 2 sprays into the nostril(s) as directed by provider Daily for 30 days. 1 spray in each nostril daily          FOLLOW-UP  Follow up  with your primary health care provider if no improvement or Urgent Care/Emergency Department if worsening of symptoms    Patient verbalizes understanding of medication dosage, comfort measures, instructions for treatment and follow-up.    Radha Mora PA-C  06/25/2020  8:14 AM  Total time 20 minutes    This visit was performed via Telehealth.  This patient has been instructed to follow-up with their primary care provider if their symptoms worsen or the treatment provided does not resolve their illness.

## 2020-06-25 NOTE — PATIENT INSTRUCTIONS
** ANY CONDITION CAN CHANGE, despite proper treatment.  ** IF YOUR SYMPTOMS WORSEN, CHANGE, or PERSIST,   then get to the nearest ER, call your doctor, or go to urgent care clinic.  ** FOLLOW-UP CARE IS YOUR RESPONSIBILITY;    Urgent Care is NOT a substitute for your primary care doctor.    ** ALWAYS FOLLOW-UP WITH YOUR PRIMARY CARE PROVIDER   to review this [facility] visit, and for possible repeat blood or urine tests, x-rays, or additional testing.    ** ALL MEDICATIONS HAVE SIDE EFFECTS.  Please review these, and ask your pharmacist or contact your primary care provider for questions or concerns.     Take any medications as prescribed.  Do NOT stop taking antibiotic just because you feel better!  FINISH IT COMPLETELY!  Increase fluids by mouth  Rest  Tylenol (acetaminophen) or Motrin (ibuprofen) every 4-6 hours as needed for fever/pain.         Strep Throat, Adult  Strep throat is an infection of the throat. It is caused by germs (bacteria). Strep throat is common during the cold months of the year. It mostly affects children who are 5-15 years old. However, people of all ages can get it at any time of the year.  When strep throat affects the tonsils, it is called tonsillitis. When it affects the back of the throat, it is called pharyngitis. This infection spreads from person to person through coughing, sneezing, or having close contact.  What are the causes?  This condition is caused by the Streptococcus pyogenes germ.  What increases the risk?  You are more likely to develop this condition if:  · You care for young children. Children are more likely to get strep throat and may spread it to others.  · You go to crowded places. Germs can spread easily in such places.  · You kiss or touch someone who has strep throat.  What are the signs or symptoms?  Symptoms of this condition include:  · Fever or chills.  · Redness, swelling, or pain in the tonsils or throat.  · Pain or trouble when swallowing.  · White or yellow  spots on the tonsils or throat.  · Tender glands in the neck and under the jaw.  · Bad breath.  · Red rash all over the body. This is rare.  How is this treated?  This condition may be treated with:  · Medicines that kill germs (antibiotics).  · Medicines that treat pain or fever. These include:  ? Ibuprofen or acetaminophen.  ? Aspirin, only for patients who are over the age of 18.  ? Throat lozenges.  ? Throat sprays.  Follow these instructions at home:  Medicines    · Take over-the-counter and prescription medicines only as told by your doctor.  · Take your antibiotic medicine as told by your doctor. Do not stop taking the antibiotic even if you start to feel better.  Eating and drinking    · If you have trouble swallowing, eat soft foods until your throat feels better.  · Drink enough fluid to keep your pee (urine) pale yellow.  · To help with pain, you may have:  ? Warm fluids, such as soup and tea.  ? Cold fluids, such as frozen desserts or popsicles.  General instructions  · Rinse your mouth (gargle) with a salt-water mixture 3-4 times a day or as needed. To make a salt-water mixture, dissolve ½-1 tsp (3-6 g) of salt in 1 cup (237 mL) of warm water.  · Rest as much as you can.  · Stay home from work or school until you have been taking antibiotics for 24 hours.  · Avoid smoking or being around people who smoke.  · Keep all follow-up visits as told by your doctor. This is important.  How is this prevented?    · Do not share food, drinking cups, or personal items. They can cause the germs to spread.  · Wash your hands well with soap and water. Make sure that all people in your house wash their hands well.  · Have family members tested if they have a fever or a sore throat. They may need an antibiotic if they have strep throat.  Contact a doctor if:  · You have swelling in your neck that keeps getting bigger.  · You get a rash, cough, or earache.  · You cough up a thick fluid that is green, yellow-brown, or  bloody.  · You have pain that does not get better with medicine.  · Your symptoms get worse instead of getting better.  · You have a fever.  Get help right away if:  · You vomit.  · You have a very bad headache.  · Your neck hurts or feels stiff.  · You have chest pain or are short of breath.  · You have drooling, very bad throat pain, or changes in your voice.  · Your neck is swollen, or the skin gets red and tender.  · Your mouth is dry, or you are peeing less than normal.  · You keep feeling more tired or have trouble waking up.  · Your joints are red or painful.  Summary  · Strep throat is an infection of the throat. It is caused by germs (bacteria).  · This infection can spread from person to person through coughing, sneezing, or having close contact.  · Take your medicines, including antibiotics, as told by your doctor. Do not stop taking the antibiotic even if you start to feel better.  · To prevent the spread of germs, wash your hands well with soap and water. Have others do the same. Do not share food, drinking cups, or personal items.  · Get help right away if you have a bad headache, chest pain, shortness of breath, a stiff or painful neck, or you vomit.  This information is not intended to replace advice given to you by your health care provider. Make sure you discuss any questions you have with your health care provider.  Document Released: 06/05/2009 Document Revised: 03/06/2020 Document Reviewed: 03/06/2020  [employer] Patient Education © 2020 [employer]    Allergic Rhinitis, Adult  Allergic rhinitis is a reaction to allergens in the air. Allergens are tiny specks (particles) in the air that cause your body to have an allergic reaction. This condition cannot be passed from person to person (is not contagious). Allergic rhinitis cannot be cured, but it can be controlled.  There are two types of allergic rhinitis:  · Seasonal. This type is also called hay fever. It happens only during certain times of  the year.  · Perennial. This type can happen at any time of the year.  What are the causes?  This condition may be caused by:  · Pollen from grasses, trees, and weeds.  · House dust mites.  · Pet dander.  · Mold.  What are the signs or symptoms?  Symptoms of this condition include:  · Sneezing.  · Runny or stuffy nose (nasal congestion).  · A lot of mucus in the back of the throat (postnasal drip).  · Itchy nose.  · Tearing of the eyes.  · Trouble sleeping.  · Being sleepy during day.  How is this treated?  There is no cure for this condition. You should avoid things that trigger your symptoms (allergens). Treatment can help to relieve symptoms. This may include:  · Medicines that block allergy symptoms, such as antihistamines. These may be given as a shot, nasal spray, or pill.  · Shots that are given until your body becomes less sensitive to the allergen (desensitization).  · Stronger medicines, if all other treatments have not worked.  Follow these instructions at home:  Avoiding allergens    · Find out what you are allergic to. Common allergens include smoke, dust, and pollen.  · Avoid them if you can. These are some of the things that you can do to avoid allergens:  ? Replace carpet with wood, tile, or vinyl maria eugenia. Carpet can trap dander and dust.  ? Clean any mold found in the home.  ? Do not smoke. Do not allow smoking in your home.  ? Change your heating and air conditioning filter at least once a month.  ? During allergy season:  § Keep windows closed as much as you can. If possible, use air conditioning when there is a lot of pollen in the air.  § Use a special filter for allergies with your furnace and air conditioner.  § Plan outdoor activities when pollen counts are lowest. This is usually during the early morning or evening hours.  § If you do go outdoors when pollen count is high, wear a special mask for people with allergies.  § When you come indoors, take a shower and change your clothes before  sitting on furniture or bedding.  General instructions  · Do not use fans in your home.  · Do not hang clothes outside to dry.  · Wear sunglasses to keep pollen out of your eyes.  · Wash your hands right away after you touch household pets.  · Take over-the-counter and prescription medicines only as told by your doctor.  · Keep all follow-up visits as told by your doctor. This is important.  Contact a doctor if:  · You have a fever.  · You have a cough that does not go away (is persistent).  · You start to make whistling sounds when you breathe (wheeze).  · Your symptoms do not get better with treatment.  · You have thick fluid coming from your nose.  · You start to have nosebleeds.  Get help right away if:  · Your tongue or your lips are swollen.  · You have trouble breathing.  · You feel dizzy or you feel like you are going to pass out (faint).  · You have cold sweats.  Summary  · Allergic rhinitis is a reaction to allergens in the air.  · This condition may be caused by allergens. These include pollen, dust mites, pet dander, and mold.  · Symptoms include a runny, itchy nose, sneezing, or tearing eyes. You may also have trouble sleeping or feel sleepy during the day.  · Treatment includes taking medicines and avoiding allergens. You may also get shots or take stronger medicines.  · Get help if you have a fever or a cough that does not stop. Get help right away if you are short of breath.  This information is not intended to replace advice given to you by your health care provider. Make sure you discuss any questions you have with your health care provider.  Document Released: 04/18/2012 Document Revised: 04/07/2020 Document Reviewed: 07/09/2019  Elsevier Patient Education © 2020 [employer] Inc.

## 2020-08-06 ENCOUNTER — TELEMEDICINE (OUTPATIENT)
Dept: FAMILY MEDICINE CLINIC | Facility: TELEHEALTH | Age: 35
End: 2020-08-06

## 2020-08-06 DIAGNOSIS — H66.90 ACUTE OTITIS MEDIA, UNSPECIFIED OTITIS MEDIA TYPE: ICD-10-CM

## 2020-08-06 DIAGNOSIS — J30.9 ALLERGIC RHINITIS, UNSPECIFIED SEASONALITY, UNSPECIFIED TRIGGER: Primary | ICD-10-CM

## 2020-08-06 PROCEDURE — 99213 OFFICE O/P EST LOW 20 MIN: CPT | Performed by: NURSE PRACTITIONER

## 2020-08-06 RX ORDER — FLUTICASONE PROPIONATE 50 MCG
2 SPRAY, SUSPENSION (ML) NASAL DAILY PRN
COMMUNITY

## 2020-08-06 RX ORDER — HYDROXYZINE PAMOATE 50 MG/1
CAPSULE ORAL
COMMUNITY
Start: 2020-05-29

## 2020-08-06 RX ORDER — AZITHROMYCIN 250 MG/1
250 TABLET, FILM COATED ORAL DAILY
Qty: 6 TABLET | Refills: 0 | Status: SHIPPED | OUTPATIENT
Start: 2020-08-06 | End: 2020-12-18

## 2020-08-06 RX ORDER — AZELASTINE 1 MG/ML
2 SPRAY, METERED NASAL 2 TIMES DAILY
Qty: 30 ML | Refills: 0 | Status: SHIPPED | OUTPATIENT
Start: 2020-08-06 | End: 2020-12-18

## 2020-08-06 NOTE — PROGRESS NOTES
CHIEF COMPLAINT  Chief Complaint   Patient presents with   • Earache       --Symptoms: none  --In the last 14 day, have you had contact with anyone who is ill, has shown any of the symptoms listed above and/or has been diagnosed with 2019 Novel Coronavirus? This includes any immediate household member but excludes any patients with whom you have been in contact within your normal work duties wearing proper PPE, if you are a healthcare worker: no    HPI  Kimberly Villanueva is a 34 y.o. female  presents with complaint of bilateral ear pressure. She reports they pop and crack when she swallows and she does have actual ear pain at times. She also has some nasal congestion. She reports she started Flonase 2 days ago. Also 2 days ago she started to have nose bleeds when she would blow her nose. She does have some dryness.     Review of Systems   Constitutional: Positive for fatigue. Negative for chills, diaphoresis and fever.   HENT: Positive for congestion, ear pain (bilateral ), nosebleeds, postnasal drip and rhinorrhea. Negative for sinus pressure, sinus pain, sneezing and sore throat.    Respiratory: Negative for cough, shortness of breath and wheezing.    Cardiovascular: Negative for chest pain.   Gastrointestinal: Negative for diarrhea, nausea and vomiting.   Musculoskeletal: Negative for arthralgias and myalgias.   Neurological: Negative for headaches.   Hematological: Negative for adenopathy.       Past Medical History:   Diagnosis Date   • ADHD    • Anxiety    • Depression        Family History   Problem Relation Age of Onset   • No Known Problems Mother    • Heart disease Father    • No Known Problems Sister    • No Known Problems Daughter    • Breast cancer Neg Hx    • Colon cancer Neg Hx    • Ovarian cancer Neg Hx        Social History     Socioeconomic History   • Marital status: Single     Spouse name: Not on file   • Number of children: Not on file   • Years of education: Not on file   • Highest education  level: Not on file   Tobacco Use   • Smoking status: Never Smoker   • Smokeless tobacco: Never Used   Substance and Sexual Activity   • Alcohol use: No     Frequency: Never   • Drug use: No   • Sexual activity: Yes     Partners: Male         LMP 07/07/2020     PHYSICAL EXAM  Physical Exam   Constitutional: She is oriented to person, place, and time. She does not have a sickly appearance. She does not appear ill. No distress.   HENT:   Head: Normocephalic and atraumatic.   Right Ear: External ear normal.   Left Ear: External ear normal.   Nose: No mucosal edema.   Mouth/Throat: Oropharynx is clear and moist.   She does have a nasal quality to her voice like she has some nasal congestion.    Neck: Neck normal appearance.  Pulmonary/Chest: Effort normal.  No respiratory distress.  Neurological: She is alert and oriented to person, place, and time.   Skin: Skin is dry.   Psychiatric: She has a normal mood and affect.         Kimberly was seen today for earache.    Diagnoses and all orders for this visit:    Allergic rhinitis, unspecified seasonality, unspecified trigger    Acute otitis media, unspecified otitis media type    Other orders  -     azelastine (ASTELIN) 0.1 % nasal spray; 2 sprays into the nostril(s) as directed by provider 2 (Two) Times a Day. Use in each nostril as directed  -     azithromycin (Zithromax Z-Arthur) 250 MG tablet; Take 1 tablet by mouth Daily. Take 2 tablets by mouth the first day, then 1 tablet daily for 4 days.        **if at any time, experiences fever AND/OR worsening cough AND/OR shortness of breath, has been advised to go to nearest urgent care or emergency department for evaluation AND/OR testing        FOLLOW-UP with PCP  if no improvement or Urgent Care/Emergency Department if worsening of symptoms    Patient verbalizes understanding of medication dosage, comfort measures, instructions for treatment and follow-up.    JAMAL Rodriguez  08/06/2020  12:01 PM    This visit was  performed via Telehealth.  This patient has been instructed to follow-up with their primary care provider if their symptoms worsen or the treatment provided does not resolve their illness.

## 2020-08-06 NOTE — PATIENT INSTRUCTIONS
Stop the Flonase and start Azelastine nasal spray  Nasal saline spray for dryness  Continue Allegra   Follow up with primary care provider if symptoms do not improve in the next 5-7 days     **if at any time, experiences fever AND/OR worsening cough AND/OR shortness of breath, has been advised to go to nearest urgent care or emergency department for evaluation AND/OR testing        FOLLOW-UP with PCP  if no improvement or Urgent Care/Emergency Department if worsening of symptoms        Otitis Media, Adult    Otitis media occurs when there is inflammation and fluid in the middle ear. Your middle ear is a part of the ear that contains bones for hearing as well as air that helps send sounds to your brain.  What are the causes?  This condition is caused by a blockage in the eustachian tube. This tube drains fluid from the ear to the back of the nose (nasopharynx). A blockage in this tube can be caused by an object or by swelling (edema) in the tube. Problems that can cause a blockage include:  · A cold or other upper respiratory infection.  · Allergies.  · An irritant, such as tobacco smoke.  · Enlarged adenoids. The adenoids are areas of soft tissue located high in the back of the throat, behind the nose and the roof of the mouth.  · A mass in the nasopharynx.  · Damage to the ear caused by pressure changes (barotrauma).  What are the signs or symptoms?  Symptoms of this condition include:  · Ear pain.  · A fever.  · Decreased hearing.  · A headache.  · Tiredness (lethargy).  · Fluid leaking from the ear.  · Ringing in the ear.  How is this diagnosed?  This condition is diagnosed with a physical exam. During the exam your health care provider will use an instrument called an otoscope to look into your ear and check for redness, swelling, and fluid. He or she will also ask about your symptoms.  Your health care provider may also order tests, such as:  · A test to check the movement of the eardrum (pneumatic otoscopy).  This test is done by squeezing a small amount of air into the ear.  · A test that changes air pressure in the middle ear to check how well the eardrum moves and whether the eustachian tube is working (tympanogram).  How is this treated?  This condition usually goes away on its own within 3-5 days. But if the condition is caused by a bacteria infection and does not go away own its own, or keeps coming back, your health care provider may:  · Prescribe antibiotic medicines to treat the infection.  · Prescribe or recommend medicines to control pain.  Follow these instructions at home:  · Take over-the-counter and prescription medicines only as told by your health care provider.  · If you were prescribed an antibiotic medicine, take it as told by your health care provider. Do not stop taking the antibiotic even if you start to feel better.  · Keep all follow-up visits as told by your health care provider. This is important.  Contact a health care provider if:  · You have bleeding from your nose.  · There is a lump on your neck.  · You are not getting better in 5 days.  · You feel worse instead of better.  Get help right away if:  · You have severe pain that is not controlled with medicine.  · You have swelling, redness, or pain around your ear.  · You have stiffness in your neck.  · A part of your face is paralyzed.  · The bone behind your ear (mastoid) is tender when you touch it.  · You develop a severe headache.  Summary  · Otitis media is redness, soreness, and swelling of the middle ear.  · This condition usually goes away on its own within 3-5 days.  · If the problem does not go away in 3-5 days, your health care provider may prescribe or recommend medicines to treat your symptoms.  · If you were prescribed an antibiotic medicine, take it as told by your health care provider.  This information is not intended to replace advice given to you by your health care provider. Make sure you discuss any questions you have  with your health care provider.  Document Released: 09/22/2005 Document Revised: 11/30/2018 Document Reviewed: 12/08/2017  [employer] Patient Education © 2020 [employer]  Allergic Rhinitis, Adult  Allergic rhinitis is a reaction to allergens in the air. Allergens are tiny specks (particles) in the air that cause your body to have an allergic reaction. This condition cannot be passed from person to person (is not contagious). Allergic rhinitis cannot be cured, but it can be controlled.  There are two types of allergic rhinitis:  · Seasonal. This type is also called hay fever. It happens only during certain times of the year.  · Perennial. This type can happen at any time of the year.  What are the causes?  This condition may be caused by:  · Pollen from grasses, trees, and weeds.  · House dust mites.  · Pet dander.  · Mold.  What are the signs or symptoms?  Symptoms of this condition include:  · Sneezing.  · Runny or stuffy nose (nasal congestion).  · A lot of mucus in the back of the throat (postnasal drip).  · Itchy nose.  · Tearing of the eyes.  · Trouble sleeping.  · Being sleepy during day.  How is this treated?  There is no cure for this condition. You should avoid things that trigger your symptoms (allergens). Treatment can help to relieve symptoms. This may include:  · Medicines that block allergy symptoms, such as antihistamines. These may be given as a shot, nasal spray, or pill.  · Shots that are given until your body becomes less sensitive to the allergen (desensitization).  · Stronger medicines, if all other treatments have not worked.  Follow these instructions at home:  Avoiding allergens    · Find out what you are allergic to. Common allergens include smoke, dust, and pollen.  · Avoid them if you can. These are some of the things that you can do to avoid allergens:  ? Replace carpet with wood, tile, or vinyl maria eugenia. Carpet can trap dander and dust.  ? Clean any mold found in the home.  ? Do not  smoke. Do not allow smoking in your home.  ? Change your heating and air conditioning filter at least once a month.  ? During allergy season:  § Keep windows closed as much as you can. If possible, use air conditioning when there is a lot of pollen in the air.  § Use a special filter for allergies with your furnace and air conditioner.  § Plan outdoor activities when pollen counts are lowest. This is usually during the early morning or evening hours.  § If you do go outdoors when pollen count is high, wear a special mask for people with allergies.  § When you come indoors, take a shower and change your clothes before sitting on furniture or bedding.  General instructions  · Do not use fans in your home.  · Do not hang clothes outside to dry.  · Wear sunglasses to keep pollen out of your eyes.  · Wash your hands right away after you touch household pets.  · Take over-the-counter and prescription medicines only as told by your doctor.  · Keep all follow-up visits as told by your doctor. This is important.  Contact a doctor if:  · You have a fever.  · You have a cough that does not go away (is persistent).  · You start to make whistling sounds when you breathe (wheeze).  · Your symptoms do not get better with treatment.  · You have thick fluid coming from your nose.  · You start to have nosebleeds.  Get help right away if:  · Your tongue or your lips are swollen.  · You have trouble breathing.  · You feel dizzy or you feel like you are going to pass out (faint).  · You have cold sweats.  Summary  · Allergic rhinitis is a reaction to allergens in the air.  · This condition may be caused by allergens. These include pollen, dust mites, pet dander, and mold.  · Symptoms include a runny, itchy nose, sneezing, or tearing eyes. You may also have trouble sleeping or feel sleepy during the day.  · Treatment includes taking medicines and avoiding allergens. You may also get shots or take stronger medicines.  · Get help if you  have a fever or a cough that does not stop. Get help right away if you are short of breath.  This information is not intended to replace advice given to you by your health care provider. Make sure you discuss any questions you have with your health care provider.  Document Released: 04/18/2012 Document Revised: 04/07/2020 Document Reviewed: 07/09/2019  Elsevier Patient Education © 2020 [employer].

## 2020-08-25 ENCOUNTER — TELEMEDICINE (OUTPATIENT)
Dept: FAMILY MEDICINE CLINIC | Facility: TELEHEALTH | Age: 35
End: 2020-08-25

## 2020-08-25 VITALS — BODY MASS INDEX: 26.68 KG/M2 | WEIGHT: 170 LBS | HEIGHT: 67 IN

## 2020-08-25 DIAGNOSIS — H66.93 BILATERAL OTITIS MEDIA, UNSPECIFIED OTITIS MEDIA TYPE: Primary | ICD-10-CM

## 2020-08-25 PROCEDURE — 99213 OFFICE O/P EST LOW 20 MIN: CPT | Performed by: NURSE PRACTITIONER

## 2020-08-25 RX ORDER — PREDNISONE 20 MG/1
20 TABLET ORAL 2 TIMES DAILY
Qty: 10 TABLET | Refills: 0 | Status: SHIPPED | OUTPATIENT
Start: 2020-08-25

## 2020-08-25 RX ORDER — CEFDINIR 300 MG/1
300 CAPSULE ORAL 2 TIMES DAILY
Qty: 20 CAPSULE | Refills: 0 | Status: SHIPPED | OUTPATIENT
Start: 2020-08-25 | End: 2020-12-18

## 2020-08-25 RX ORDER — GUAIFENESIN 600 MG/1
600 TABLET, EXTENDED RELEASE ORAL 2 TIMES DAILY
Qty: 28 TABLET | Refills: 0 | Status: SHIPPED | OUTPATIENT
Start: 2020-08-25 | End: 2020-09-08

## 2020-08-25 NOTE — PROGRESS NOTES
"CHIEF COMPLAINT  Chief Complaint   Patient presents with   • Earache         HPI  Kimberly Villanueva is a 34 y.o. female  presents with complaint of bilateral ear pain >left. She was on a plane and at that time felt dizzy. She has tried azelastine nasal spray and Claritin without relief of symptoms. She does report a history of ear infections. She now feels like her ears are draining. She also reports that amoxicillin does not seem to be very effective for her,    Review of Systems   Constitutional: Negative for fever.   HENT: Positive for ear discharge (\"feels like it\"), ear pain (bilateral>left) and sinus pressure. Negative for sore throat.    Respiratory: Negative for shortness of breath, wheezing and stridor.    Cardiovascular: Negative.    Gastrointestinal: Negative for diarrhea, nausea and vomiting.   Musculoskeletal: Negative for myalgias.   Neurological: Positive for dizziness. Negative for headaches.       Past Medical History:   Diagnosis Date   • ADHD    • Anxiety    • Depression        Family History   Problem Relation Age of Onset   • No Known Problems Mother    • Heart disease Father    • No Known Problems Sister    • No Known Problems Daughter    • Breast cancer Neg Hx    • Colon cancer Neg Hx    • Ovarian cancer Neg Hx        Social History     Socioeconomic History   • Marital status: Single     Spouse name: Not on file   • Number of children: Not on file   • Years of education: Not on file   • Highest education level: Not on file   Tobacco Use   • Smoking status: Never Smoker   • Smokeless tobacco: Never Used   Substance and Sexual Activity   • Alcohol use: No     Frequency: Never   • Drug use: No   • Sexual activity: Yes     Partners: Male         Ht 170.2 cm (67\")   Wt 77.1 kg (170 lb)   BMI 26.63 kg/m²     PHYSICAL EXAM  Physical Exam   Constitutional: She appears well-developed and well-nourished.   HENT:   Head: Normocephalic and atraumatic.   Right Ear: Hearing normal. There is tenderness. " "Right ear drainage: reported \"that it feels like it \"   Left Ear: Hearing normal. There is tenderness. Left ear drainage: reported \"that it feels like it \"   Nose: Nose normal.   Eyes: Lids are normal. Right eye exhibits no discharge and no exudate. Left eye exhibits no discharge and no exudate. Right conjunctiva is not injected. Left conjunctiva is not injected.   Pulmonary/Chest: No accessory muscle usage. No tachypnea and no bradypnea.  No respiratory distress.No use of oxygen by nasal cannulaNo use of oxygen by mask noted.  Neurological: She is alert. No cranial nerve deficit.   Psychiatric: Her speech is normal and behavior is normal. Judgment and thought content normal. She mood appears anxious.       Results for orders placed or performed in visit on 05/26/20   TSH Rfx On Abnormal To Free T4   Result Value Ref Range    TSH 3.740 0.270 - 4.200 uIU/mL   T4, free   Result Value Ref Range    Free T4 1.18 0.93 - 1.70 ng/dL       Kimberly was seen today for earache.    Diagnoses and all orders for this visit:    Bilateral otitis media, unspecified otitis media type    Other orders  -     cefdinir (OMNICEF) 300 MG capsule; Take 1 capsule by mouth 2 (Two) Times a Day.  -     predniSONE (DELTASONE) 20 MG tablet; Take 1 tablet by mouth 2 (Two) Times a Day.  -     guaiFENesin (Mucinex) 600 MG 12 hr tablet; Take 1 tablet by mouth 2 (Two) Times a Day for 14 days.    Mucinex with lots of water  May continue claritin  Probiotics for two weeks related to taking antibiotics. The pharmacist can help you with this if needed. Do not take within two hours of antibiotic    FOLLOW-UP  Follow up if symptoms worsen or persist      if at any time you experience fever with cough AND/OR shortness of breath AND/OR loss of sense of taste or smell, has been advised to go to nearest urgent care or emergency department for evaluation AND/OR testing    if no improvement, but not worsening, may schedule a f/u virtual visit or " E-visit        Patient verbalizes understanding of medication dosage, comfort measures, instructions for treatment and follow-up.    Kathie Corea, JAMAL  08/25/2020  1:41 PM    This visit was performed via Telehealth.  This patient has been instructed to follow-up with their primary care provider if their symptoms worsen or the treatment provided does not resolve their illness.

## 2020-12-18 ENCOUNTER — TELEMEDICINE (OUTPATIENT)
Dept: FAMILY MEDICINE CLINIC | Facility: TELEHEALTH | Age: 35
End: 2020-12-18

## 2020-12-18 VITALS — WEIGHT: 160 LBS | HEIGHT: 67 IN | BODY MASS INDEX: 25.11 KG/M2

## 2020-12-18 DIAGNOSIS — J06.9 ACUTE URI: Primary | ICD-10-CM

## 2020-12-18 PROCEDURE — 99213 OFFICE O/P EST LOW 20 MIN: CPT | Performed by: NURSE PRACTITIONER

## 2020-12-18 RX ORDER — PREDNISONE 20 MG/1
20 TABLET ORAL 2 TIMES DAILY
Qty: 10 TABLET | Refills: 0 | Status: SHIPPED | OUTPATIENT
Start: 2020-12-18

## 2020-12-18 RX ORDER — GUAIFENESIN 600 MG/1
600 TABLET, EXTENDED RELEASE ORAL 2 TIMES DAILY
Qty: 28 TABLET | Refills: 0 | Status: SHIPPED | OUTPATIENT
Start: 2020-12-18 | End: 2021-01-01

## 2020-12-18 NOTE — PROGRESS NOTES
"CHIEF COMPLAINT  Cc:sore throat    HPI  Kimberly Villanueva is a 35 y.o. female  presents with complaints of a sore throat that started today. She also has some mild nasal congestion, right ear pain and sinus pressure. She has not taken anything for her symptoms.    Review of Systems   Constitutional: Positive for chills. Negative for fatigue and fever.   HENT: Positive for congestion (mild), ear pain (right ear), sinus pressure (mild) and sore throat.         No loss of taste and smell   Respiratory: Negative for cough, shortness of breath and wheezing.    Cardiovascular: Negative for chest pain.   Gastrointestinal: Negative for diarrhea, nausea and vomiting.   Musculoskeletal: Negative for myalgias.   Skin: Negative for rash.   Neurological: Negative for headaches.       Past Medical History:   Diagnosis Date   • ADHD    • Anxiety    • Depression        Family History   Problem Relation Age of Onset   • No Known Problems Mother    • Heart disease Father    • No Known Problems Sister    • No Known Problems Daughter    • Breast cancer Neg Hx    • Colon cancer Neg Hx    • Ovarian cancer Neg Hx        Social History     Socioeconomic History   • Marital status: Single     Spouse name: Not on file   • Number of children: Not on file   • Years of education: Not on file   • Highest education level: Not on file   Tobacco Use   • Smoking status: Never Smoker   • Smokeless tobacco: Never Used   Substance and Sexual Activity   • Alcohol use: No     Frequency: Never   • Drug use: No   • Sexual activity: Yes     Partners: Male         Ht 170.2 cm (67\")   Wt 72.6 kg (160 lb)   Breastfeeding No   BMI 25.06 kg/m²     PHYSICAL EXAM  Physical Exam   Constitutional: She appears well-developed and well-nourished.   HENT:   Head: Normocephalic and atraumatic.   Right Ear: Hearing and external ear normal. No tenderness.   Left Ear: Hearing and external ear normal. No tenderness.   Nose: Congestion (mild) present.   Mouth/Throat: " Mouth/Lips are normal.No oropharyngeal exudate.   Left upper palate with mild erythema   Eyes: Lids are normal. Right eye exhibits no discharge and no exudate. Left eye exhibits no discharge and no exudate. Right conjunctiva is not injected. Left conjunctiva is not injected.   Pulmonary/Chest: No accessory muscle usage. No tachypnea and no bradypnea.  No respiratory distress.No use of oxygen by nasal cannulaNo use of oxygen by mask noted.  Abdominal: Abdomen appears normal.   Lymphadenopathy:        Right cervical: No anterior cervical (patient directed exam) adenopathy present.       Left cervical: No anterior cervical (patient directed exam) adenopathy present.   Neurological: She is alert. No cranial nerve deficit.   Skin: Her skin appears normal.  Psychiatric: She has a normal mood and affect. Her speech is normal and behavior is normal. Judgment and thought content normal.       Results for orders placed or performed in visit on 05/26/20   TSH Rfx On Abnormal To Free T4    Specimen: Blood   Result Value Ref Range    TSH 3.740 0.270 - 4.200 uIU/mL   T4, free    Specimen: Blood   Result Value Ref Range    Free T4 1.18 0.93 - 1.70 ng/dL       Diagnoses and all orders for this visit:    1. Acute URI (Primary)    Other orders  -     predniSONE (DELTASONE) 20 MG tablet; Take 1 tablet by mouth 2 (Two) Times a Day.  Dispense: 10 tablet; Refill: 0  -     guaiFENesin (Mucinex) 600 MG 12 hr tablet; Take 1 tablet by mouth 2 (Two) Times a Day for 14 days.  Dispense: 28 tablet; Refill: 0      Mucinex with plenty of fluids especially water to thin secretions and help with congestion.  Take prednisone with food and not to close to bedtime as it may keep you awake.    FOLLOW-UP   If symptoms worsen you followup with a PCP, Virtual or Urgent Care visit    COVID-19  if at any time you experience fever AND/OR cough AND/OR shortness of breath AND/OR loss of sense of taste or smell you are being advised to go to nearest urgent care  or emergency department for evaluation AND/OR testing      Patient verbalizes understanding of medication dosage, comfort measures, instructions for treatment and follow-up.    Kathie Corea, APRN  12/18/2020  17:31 EST    This visit was performed via Telehealth.  This patient has been instructed to follow-up with their primary care provider if their symptoms worsen or the treatment provided does not resolve their illness.

## 2020-12-18 NOTE — PATIENT INSTRUCTIONS

## 2020-12-20 RX ORDER — AZITHROMYCIN 250 MG/1
TABLET, FILM COATED ORAL
Qty: 6 TABLET | Refills: 0 | Status: SHIPPED | OUTPATIENT
Start: 2020-12-20